# Patient Record
Sex: MALE | Race: WHITE | NOT HISPANIC OR LATINO | Employment: FULL TIME | URBAN - METROPOLITAN AREA
[De-identification: names, ages, dates, MRNs, and addresses within clinical notes are randomized per-mention and may not be internally consistent; named-entity substitution may affect disease eponyms.]

---

## 2018-01-05 ENCOUNTER — HOSPITAL ENCOUNTER (EMERGENCY)
Facility: HOSPITAL | Age: 28
Discharge: HOME | End: 2018-01-05
Attending: EMERGENCY MEDICINE | Admitting: EMERGENCY MEDICINE

## 2018-01-05 ENCOUNTER — APPOINTMENT (OUTPATIENT)
Dept: CT IMAGING | Facility: HOSPITAL | Age: 28
End: 2018-01-05

## 2018-01-05 VITALS
HEART RATE: 70 BPM | TEMPERATURE: 98.3 F | DIASTOLIC BLOOD PRESSURE: 83 MMHG | HEIGHT: 74 IN | BODY MASS INDEX: 34.01 KG/M2 | SYSTOLIC BLOOD PRESSURE: 158 MMHG | WEIGHT: 265 LBS | OXYGEN SATURATION: 98 % | RESPIRATION RATE: 16 BRPM

## 2018-01-05 DIAGNOSIS — S09.90XA INJURY OF HEAD, INITIAL ENCOUNTER: Primary | ICD-10-CM

## 2018-01-05 DIAGNOSIS — S01.81XA FACIAL LACERATION, INITIAL ENCOUNTER: ICD-10-CM

## 2018-01-05 PROCEDURE — 99282 EMERGENCY DEPT VISIT SF MDM: CPT

## 2018-01-05 PROCEDURE — 90715 TDAP VACCINE 7 YRS/> IM: CPT | Performed by: EMERGENCY MEDICINE

## 2018-01-05 PROCEDURE — 25010000002 TDAP 5-2.5-18.5 LF-MCG/0.5 SUSPENSION: Performed by: EMERGENCY MEDICINE

## 2018-01-05 PROCEDURE — 70450 CT HEAD/BRAIN W/O DYE: CPT

## 2018-01-05 PROCEDURE — 70486 CT MAXILLOFACIAL W/O DYE: CPT

## 2018-01-05 PROCEDURE — 90471 IMMUNIZATION ADMIN: CPT | Performed by: EMERGENCY MEDICINE

## 2018-01-05 RX ORDER — LIDOCAINE HYDROCHLORIDE AND EPINEPHRINE 10; 10 MG/ML; UG/ML
10 INJECTION, SOLUTION INFILTRATION; PERINEURAL ONCE
Status: DISCONTINUED | OUTPATIENT
Start: 2018-01-05 | End: 2018-01-06 | Stop reason: HOSPADM

## 2018-01-05 RX ADMIN — TETANUS TOXOID, REDUCED DIPHTHERIA TOXOID AND ACELLULAR PERTUSSIS VACCINE, ADSORBED 0.5 ML: 5; 2.5; 8; 8; 2.5 SUSPENSION INTRAMUSCULAR at 20:15

## 2018-01-06 NOTE — ED NOTES
Discussed discharge instructions with patient, patient verbalized understanding, pt left with NAD.     Luisa Bah RN  01/05/18 2984

## 2018-01-06 NOTE — ED PROVIDER NOTES
Procedures:   Laceration Repair  Date/Time: 1/5/2018 9:01 PM  Performed by: LORENZO YATES III  Authorized by: GINGER REARDON     Consent:     Consent obtained:  Verbal    Consent given by:  Patient  Anesthesia (see MAR for exact dosages):     Anesthesia method:  Local infiltration    Local anesthetic:  Lidocaine 1% WITH epi  Laceration details:     Location:  Face    Face location:  Forehead (R)    Length (cm):  5 (J-shaped)  Pre-procedure details:     Preparation:  Patient was prepped and draped in usual sterile fashion  Exploration:     Hemostasis achieved with:  Direct pressure  Treatment:     Area cleansed with:  Hibiclens    Amount of cleaning:  Standard    Irrigation solution:  Sterile saline    Irrigation method:  Syringe  Mucous membrane repair:     Suture size:  5-0 (subcutaneous)    Suture material:  Vicryl    Suture technique:  Simple interrupted    Number of sutures:  5  Skin repair:     Repair method:  Sutures    Suture size:  6-0    Suture material:  Nylon    Suture technique:  Simple interrupted    Number of sutures:  15  Approximation:     Approximation:  Close  Post-procedure details:     Patient tolerance of procedure:  Tolerated well, no immediate complications    Advised the pt to f/u to have his sutures removed in 7 days.     Documentation assistance provided by pritesh Ayala for OTIS Yates.  Information recorded by the pritesh was done at my direction and has been verified and validated by me.     Angel Ayala  01/05/18 2133       OTIS Graham III  01/06/18 1401

## 2018-01-06 NOTE — ED PROVIDER NOTES
EMERGENCY DEPARTMENT ENCOUNTER    CHIEF COMPLAINT  Chief Complaint: facial laceration  History given by: patient  History limited by: nothing  Room Number: 51/51  PMD: Edison Manzano MD      HPI:  Pt is a 27 y.o. male who presents complaining of a facial lacerations on his right eyebrow and right side of his lip after an overhead garage door fell on him earlier today. He denies LOC, blurry or double vision, neck pain, back pain, nausea and vomiting. He has some numbness and tingling around the affected areas. Pt has no other complaints at this time.    Duration:  Several hours  Onset: sudden  Timing: constant  Location: right eyebrow, right side of lip  Radiation: none  Quality: laacerations  Intensity/Severity: moderate  Progression: unchanged  Associated Symptoms: numbness and tingling around the affected areas.  Aggravating Factors: none  Alleviating Factors: none  Previous Episodes: none  Treatment before arrival: none    PAST MEDICAL HISTORY  Active Ambulatory Problems     Diagnosis Date Noted   • No Active Ambulatory Problems     Resolved Ambulatory Problems     Diagnosis Date Noted   • No Resolved Ambulatory Problems     No Additional Past Medical History       PAST SURGICAL HISTORY  Past Surgical History:   Procedure Laterality Date   • KNEE SURGERY      RIGHT       FAMILY HISTORY  History reviewed. No pertinent family history.    SOCIAL HISTORY  Social History     Social History   • Marital status: Single     Spouse name: N/A   • Number of children: N/A   • Years of education: N/A     Occupational History   • Not on file.     Social History Main Topics   • Smoking status: Never Smoker   • Smokeless tobacco: Not on file   • Alcohol use No   • Drug use: No   • Sexual activity: Not on file     Other Topics Concern   • Not on file     Social History Narrative   • No narrative on file       ALLERGIES  Review of patient's allergies indicates no known allergies.    REVIEW OF SYSTEMS  Review of Systems    Constitutional: Negative for activity change, appetite change and fever.   HENT: Negative for congestion and sore throat.    Eyes: Negative.    Respiratory: Negative for cough and shortness of breath.    Cardiovascular: Negative for chest pain and leg swelling.   Gastrointestinal: Negative for abdominal pain, diarrhea and vomiting.   Endocrine: Negative.    Genitourinary: Negative for decreased urine volume and dysuria.   Musculoskeletal: Negative for neck pain.   Skin: Positive for wound (laaceration on right eyebrow, right side of lip). Negative for rash.   Allergic/Immunologic: Negative.    Neurological: Negative for weakness, numbness and headaches.   Hematological: Negative.    Psychiatric/Behavioral: Negative.    All other systems reviewed and are negative.      PHYSICAL EXAM  ED Triage Vitals   Temp Heart Rate Resp BP SpO2   01/05/18 1854 01/05/18 1854 01/05/18 1854 01/05/18 1854 01/05/18 1854   98.3 °F (36.8 °C) 70 16 158/83 98 %      Temp src Heart Rate Source Patient Position BP Location FiO2 (%)   01/05/18 1854 -- -- -- --   Tympanic           Physical Exam   Constitutional: He is oriented to person, place, and time and well-developed, well-nourished, and in no distress.   HENT:   Head: Normocephalic and atraumatic.   Teeth are intact, bite is normal   Eyes: EOM are normal. Pupils are equal, round, and reactive to light.   Neck: Normal range of motion. Neck supple.   Cardiovascular: Normal rate, regular rhythm and normal heart sounds.    Pulmonary/Chest: Effort normal and breath sounds normal. No respiratory distress.   Abdominal: Soft. There is no tenderness. There is no rebound and no guarding.   Musculoskeletal: Normal range of motion. He exhibits no edema.   Neurological: He is alert and oriented to person, place, and time. He has normal sensation and normal strength.   Glascow coma scale is 15   Skin: Skin is warm and dry.   Linear laceration above right brow with swelling and tenderness.  Infraorbital bruising on right. Abrasion to upper lip at base of his nose and laceration to the inside of his lip on the right side.   Psychiatric: Mood and affect normal.   Nursing note and vitals reviewed.      LAB RESULTS  Lab Results (last 24 hours)     ** No results found for the last 24 hours. **          I ordered the above labs and reviewed the results    RADIOLOGY  CT Head Without Contrast         CT Facial Bones Without Contrast         Reviewed CT head and facial bones which show nothing acute, but there are a couple of dental caries that need to be addressed. Independently viewed by me. Interpreted by radiologist. Discussed with Dr. Paul.      I ordered the above noted radiological studies. Interpreted by radiologist. Discussed with radiologist (Dr. Paul). Reviewed by me in PACS.       PROCEDURES  Procedures      PROGRESS AND CONSULTS  ED Course     1946  Ordered tdap booster. Ordered CT head and CT facial bones for further evaluation.    2307  Dion Yates PA-C at bedside for lac repair.    2133  Rechecked the patient and he is sitting comfortably. Discussed the patient's pertinent labs and imaging results, including negative head CT and facial bones. Discussed plan to discharge the patient home and instructed Pt to have his sutures removed in 5-7 days. Patient agrees with the plan and all questions were addressed.     Latest vital signs   BP- 158/83 HR- 70 Temp- 98.3 °F (36.8 °C) (Tympanic) O2 sat- 98%      MEDICAL DECISION MAKING  Results were reviewed/discussed with the patient and they were also made aware of online access. Pt also made aware that some labs, such as cultures, will not be resulted during ER visit and follow up with PMD is necessary.     MDM  Number of Diagnoses or Management Options     Amount and/or Complexity of Data Reviewed  Tests in the radiology section of CPT®: ordered and reviewed (CT head and facial bones show nothing acute, but there are a couple of dental caries that  need to be addressed.)  Discussion of test results with the performing providers: yes (Dr. Paul (radiology))           DIAGNOSIS  Final diagnoses:   Injury of head, initial encounter   Facial laceration, initial encounter       DISPOSITION  DISCHARGE    Patient discharged in stable condition.    Reviewed implications of results, diagnosis, meds, responsibility to follow up, warning signs and symptoms of possible worsening, potential complications.    Patient/Family voiced understanding of above instructions.    Discussed plan for discharge, as there is no emergent indication for admission.  Pt/family is agreeable and understands need for follow up and repeat testing.  Pt is aware that discharge does not mean that nothing is wrong but it indicates no emergency is present that requires admission and they must continue care with follow-up as given below or physician of their choice.     FOLLOW-UP  Edison Manzano MD  [address] [place], [place] [id number] [phone]      For suture removal, For wound re-check         Medication List      Notice     No changes were made to your prescriptions during this visit.            Latest Documented Vital Signs:  As of 9:35 PM  BP- 158/83 HR- 70 Temp- 98.3 °F (36.8 °C) (Tympanic) O2 sat- 98%    --  Documentation assistance provided by pritesh Tan for Dr. Azar.  Information recorded by the scribe was done at my direction and has been verified and validated by me.       Florinda Tan  01/05/18 1631       Lobo Azar MD  01/05/18 [id number]

## 2018-01-06 NOTE — ED NOTES
Pt reports that he was at work and an overhead door fell, tried to catch it with his hands and missed, struck in R side of forehead, R cheek, R side of nose with laceration (covered), bruising to R cheek and abrasion next to R nare.  Pt has swelling to lips on R side and reports laceration inside lower lip.     Luisa Bah RN  01/05/18 1944

## 2018-01-09 ENCOUNTER — TELEPHONE (OUTPATIENT)
Dept: SOCIAL WORK | Facility: HOSPITAL | Age: 28
End: 2018-01-09

## 2018-01-09 NOTE — TELEPHONE ENCOUNTER
Spoke with pt today in f/u and he states he feels fine. He has a f/u appt with his PCP for Saturday to have his wound rechecked and sutures removed. Pt states he was not given any scrips upon discharge. No other questions or concerns voiced by pt at this time. Mildred BA

## 2019-08-29 ENCOUNTER — HOSPITAL ENCOUNTER (EMERGENCY)
Facility: HOSPITAL | Age: 29
Discharge: ANOTHER HOSPITAL | End: 2019-08-29
Attending: EMERGENCY MEDICINE | Admitting: EMERGENCY MEDICINE

## 2019-08-29 ENCOUNTER — APPOINTMENT (OUTPATIENT)
Dept: GENERAL RADIOLOGY | Facility: HOSPITAL | Age: 29
End: 2019-08-29

## 2019-08-29 VITALS
BODY MASS INDEX: 34.01 KG/M2 | SYSTOLIC BLOOD PRESSURE: 131 MMHG | TEMPERATURE: 98.7 F | RESPIRATION RATE: 18 BRPM | HEART RATE: 77 BPM | DIASTOLIC BLOOD PRESSURE: 80 MMHG | OXYGEN SATURATION: 98 % | HEIGHT: 74 IN | WEIGHT: 265 LBS

## 2019-08-29 DIAGNOSIS — S62.639B: ICD-10-CM
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
## 2019-08-29 PROCEDURE — 73130 X-RAY EXAM OF HAND: CPT

## 2019-08-29 PROCEDURE — 96375 TX/PRO/DX INJ NEW DRUG ADDON: CPT

## 2019-08-29 PROCEDURE — 99283 EMERGENCY DEPT VISIT LOW MDM: CPT

## 2019-08-29 PROCEDURE — 25010000002 MORPHINE PER 10 MG: Performed by: NURSE PRACTITIONER

## 2019-08-29 PROCEDURE — 25010000003 CEFAZOLIN 1-4 GM/50ML-% SOLUTION: Performed by: NURSE PRACTITIONER

## 2019-08-29 PROCEDURE — 96365 THER/PROPH/DIAG IV INF INIT: CPT

## 2019-08-29 PROCEDURE — 25010000002 ONDANSETRON PER 1 MG: Performed by: NURSE PRACTITIONER

## 2019-08-29 RX ORDER — ONDANSETRON 2 MG/ML
4 INJECTION INTRAMUSCULAR; INTRAVENOUS ONCE
Status: COMPLETED | OUTPATIENT
Start: 2019-08-29 | End: 2019-08-29

## 2019-08-29 RX ORDER — CEFAZOLIN SODIUM 1 G/50ML
1 INJECTION, SOLUTION INTRAVENOUS ONCE
Status: COMPLETED | OUTPATIENT
Start: 2019-08-29 | End: 2019-08-29

## 2019-08-29 RX ORDER — MORPHINE SULFATE 2 MG/ML
4 INJECTION, SOLUTION INTRAMUSCULAR; INTRAVENOUS ONCE
Status: COMPLETED | OUTPATIENT
Start: 2019-08-29 | End: 2019-08-29

## 2019-08-29 RX ADMIN — MORPHINE SULFATE 4 MG: 2 INJECTION, SOLUTION INTRAMUSCULAR; INTRAVENOUS at 12:24

## 2019-08-29 RX ADMIN — CEFAZOLIN SODIUM 1 G: 1 INJECTION, SOLUTION INTRAVENOUS at 12:24

## 2019-08-29 RX ADMIN — ONDANSETRON 4 MG: 2 INJECTION INTRAMUSCULAR; INTRAVENOUS at 12:24

## 2024-02-27 ENCOUNTER — PATIENT ROUNDING (BHMG ONLY) (OUTPATIENT)
Dept: URGENT CARE | Facility: CLINIC | Age: 34
End: 2024-02-27
Payer: COMMERCIAL

## 2024-02-27 NOTE — ED NOTES
Thank you for letting us care for you in your recent visit to our urgent care center. We would love to hear about your experience with us. Was this the first time you have visited our location?    We’re always looking for ways to make our patients’ experiences even better. Do you have any recommendations on ways we may improve?     I appreciate you taking the time to respond. Please be on the lookout for a survey about your recent visit from ralali via text or email. We would greatly appreciate if you could fill that out and turn it back in. We want your voice to be heard and we value your feedback.   Thank you for choosing [facility] for your healthcare needs.